# Patient Record
Sex: MALE | Race: WHITE | NOT HISPANIC OR LATINO | RURAL
[De-identification: names, ages, dates, MRNs, and addresses within clinical notes are randomized per-mention and may not be internally consistent; named-entity substitution may affect disease eponyms.]

---

## 2021-10-05 ENCOUNTER — OFFICE VISIT (OUTPATIENT)
Dept: RURAL CLINIC 4 | Facility: CLINIC | Age: 46
End: 2021-10-05

## 2021-11-09 ENCOUNTER — OFFICE VISIT (OUTPATIENT)
Dept: RURAL CLINIC 4 | Facility: CLINIC | Age: 46
End: 2021-11-09

## 2021-11-18 ENCOUNTER — OFFICE VISIT (OUTPATIENT)
Dept: RURAL CLINIC 4 | Facility: CLINIC | Age: 46
End: 2021-11-18

## 2024-04-10 ENCOUNTER — OV NP (OUTPATIENT)
Dept: URBAN - METROPOLITAN AREA CLINIC 54 | Facility: CLINIC | Age: 49
End: 2024-04-10
Payer: COMMERCIAL

## 2024-04-10 VITALS
BODY MASS INDEX: 30.16 KG/M2 | DIASTOLIC BLOOD PRESSURE: 70 MMHG | SYSTOLIC BLOOD PRESSURE: 114 MMHG | WEIGHT: 199 LBS | TEMPERATURE: 97.4 F | HEART RATE: 69 BPM | HEIGHT: 68 IN

## 2024-04-10 DIAGNOSIS — K52.89 OTHER AND UNSPECIFIED NONINFECTIOUS GASTROENTERITIS AND COLITIS: ICD-10-CM

## 2024-04-10 DIAGNOSIS — N17.9 ACUTE KIDNEY INJURY: ICD-10-CM

## 2024-04-10 DIAGNOSIS — R19.5 LOOSE STOOLS: ICD-10-CM

## 2024-04-10 DIAGNOSIS — D72.829 LEUKOCYTOSIS, UNSPECIFIED TYPE: ICD-10-CM

## 2024-04-10 PROBLEM — 111583006: Status: ACTIVE | Noted: 2024-04-10

## 2024-04-10 PROCEDURE — 99244 OFF/OP CNSLTJ NEW/EST MOD 40: CPT

## 2024-04-10 PROCEDURE — 99204 OFFICE O/P NEW MOD 45 MIN: CPT

## 2024-04-10 RX ORDER — ONDANSETRON HYDROCHLORIDE 4 MG/1
1 TABLET TABLET, FILM COATED ORAL ONCE A DAY
Status: ACTIVE | COMMUNITY

## 2024-04-10 RX ORDER — SUCRALFATE 1 G/1
1 TABLET ON AN EMPTY STOMACH TABLET ORAL TWICE A DAY
Status: ACTIVE | COMMUNITY

## 2024-04-10 RX ORDER — LISINOPRIL AND HYDROCHLOROTHIAZIDE 10; 12.5 MG/1; MG/1
1 TABLET TABLET ORAL ONCE A DAY
Status: ACTIVE | COMMUNITY

## 2024-04-10 RX ORDER — DICYCLOMINE HYDROCHLORIDE 20 MG/1
1 TABLET TABLET ORAL THREE TIMES A DAY
Status: ACTIVE | COMMUNITY

## 2024-04-10 RX ORDER — PANTOPRAZOLE SODIUM 20 MG/1
1 TABLET TABLET, DELAYED RELEASE ORAL ONCE A DAY
Status: ACTIVE | COMMUNITY

## 2024-04-10 RX ORDER — AMOXICILLIN AND CLAVULANATE POTASSIUM 875; 125 MG/1; MG/1
1 TABLET TABLET, FILM COATED ORAL
Status: ACTIVE | COMMUNITY

## 2024-04-10 NOTE — HPI-TODAY'S VISIT:
4/10/24: Patient was referred by Dr. Elise Nagy for diverticula of colon. A copy of this document will be sent to the provider. Pt is a 49 yo male with a PMH of HTN who presents for [facility] ER f/u from 3/31 colitis. Pt presented to the ER with 3 days of diarrhea (2-3 loose BMs/day) and periumbilical abd pain. No significant n/v but pt did have loss of appetite and reports 10 lb weight loss during that time. No hematochezia, melena, fever. Workup revealed mild ANTONIA (Cr 1.6, BUN 32) and leukocytosis (14.2). CT revealed colitis from cecum to proximal transverse colon and possibily in distal sigmoid and rectum. Pt was given IV Zosyn then discharged on Augmentin which he is still taking. Abd pain has mostly resolved, appetite is normal, but pt is still having about 2 loose stools/day. Had cscope last year in [place] at [facility]. No family hx of CRC or IBD.  CT abd/pelvis W contrast 3/31/24: There is colonic wall thickening along the cecum through the proximal transverse segment with mild stranding of adjacent fat that may be infectious or inflammatory in nature. There is mild wall thickening along the distal sigmoid colon and rectum which could be due to incomplete distention but is also nonspecific. Follow-up is recommended to assess for resolution.

## 2024-04-11 LAB
A/G RATIO: 1.7
ABSOLUTE BASOPHILS: 71
ABSOLUTE EOSINOPHILS: 276
ABSOLUTE LYMPHOCYTES: 2225
ABSOLUTE MONOCYTES: 1006
ABSOLUTE NEUTROPHILS: 5322
ALBUMIN: 4.5
ALKALINE PHOSPHATASE: 109
ALT (SGPT): 49
AST (SGOT): 23
BASOPHILS: 0.8
BILIRUBIN, TOTAL: 0.4
BUN/CREATININE RATIO: 29
BUN: 30
CALCIUM: 9.4
CARBON DIOXIDE, TOTAL: 26
CHLORIDE: 103
CREATININE: 1.04
EGFR: 90
EOSINOPHILS: 3.1
GLOBULIN, TOTAL: 2.6
GLUCOSE: 79
HEMATOCRIT: 44.5
HEMOGLOBIN: 15.1
LYMPHOCYTES: 25
MCH: 30
MCHC: 33.9
MCV: 88.5
MONOCYTES: 11.3
MPV: 9.6
NEUTROPHILS: 59.8
PLATELET COUNT: 303
POTASSIUM: 4.5
PROTEIN, TOTAL: 7.1
RDW: 12.9
RED BLOOD CELL COUNT: 5.03
SODIUM: 140
WHITE BLOOD CELL COUNT: 8.9

## 2024-05-08 ENCOUNTER — LAB OUTSIDE AN ENCOUNTER (OUTPATIENT)
Dept: URBAN - METROPOLITAN AREA CLINIC 54 | Facility: CLINIC | Age: 49
End: 2024-05-08

## 2024-05-15 ENCOUNTER — LAB OUTSIDE AN ENCOUNTER (OUTPATIENT)
Dept: URBAN - METROPOLITAN AREA CLINIC 54 | Facility: CLINIC | Age: 49
End: 2024-05-15